# Patient Record
Sex: MALE | Race: BLACK OR AFRICAN AMERICAN | NOT HISPANIC OR LATINO | Employment: UNEMPLOYED | URBAN - METROPOLITAN AREA
[De-identification: names, ages, dates, MRNs, and addresses within clinical notes are randomized per-mention and may not be internally consistent; named-entity substitution may affect disease eponyms.]

---

## 2021-02-24 ENCOUNTER — ANCILLARY PROCEDURE (OUTPATIENT)
Dept: CT IMAGING | Facility: CLINIC | Age: 41
End: 2021-02-24
Attending: FAMILY MEDICINE
Payer: COMMERCIAL

## 2021-02-24 DIAGNOSIS — G43.709 CHRONIC MIGRAINE WITHOUT AURA WITHOUT STATUS MIGRAINOSUS, NOT INTRACTABLE: ICD-10-CM

## 2021-02-24 DIAGNOSIS — G44.89 OTHER HEADACHE SYNDROME: ICD-10-CM

## 2021-02-24 PROCEDURE — 70450 CT HEAD/BRAIN W/O DYE: CPT | Mod: GC | Performed by: RADIOLOGY

## 2021-02-24 PROCEDURE — 70491 CT SOFT TISSUE NECK W/DYE: CPT | Mod: GC | Performed by: RADIOLOGY

## 2021-02-24 RX ORDER — IOPAMIDOL 755 MG/ML
100 INJECTION, SOLUTION INTRAVASCULAR ONCE
Status: COMPLETED | OUTPATIENT
Start: 2021-02-24 | End: 2021-02-24

## 2021-02-24 RX ADMIN — IOPAMIDOL 100 ML: 755 INJECTION, SOLUTION INTRAVASCULAR at 16:49

## 2021-03-26 ENCOUNTER — THERAPY VISIT (OUTPATIENT)
Dept: PHYSICAL THERAPY | Facility: CLINIC | Age: 41
End: 2021-03-26
Payer: COMMERCIAL

## 2021-03-26 DIAGNOSIS — M25.551 HIP PAIN, RIGHT: ICD-10-CM

## 2021-03-26 DIAGNOSIS — M25.552 HIP PAIN, LEFT: ICD-10-CM

## 2021-03-26 PROCEDURE — 97161 PT EVAL LOW COMPLEX 20 MIN: CPT | Mod: GP | Performed by: PHYSICAL THERAPIST

## 2021-03-26 PROCEDURE — 97110 THERAPEUTIC EXERCISES: CPT | Mod: GP | Performed by: PHYSICAL THERAPIST

## 2021-03-26 ASSESSMENT — ACTIVITIES OF DAILY LIVING (ADL)
GOING_DOWN_1_FLIGHT_OF_STAIRS: NO DIFFICULTY AT ALL
WALKING_DOWN_STEEP_HILLS: NO DIFFICULTY AT ALL
ROLLING_OVER_IN_BED: NO DIFFICULTY AT ALL
HOS_ADL_SCORE(%): 100
HOS_ADL_HIGHEST_POTENTIAL_SCORE: 68
GETTING_INTO_AND_OUT_OF_A_BATHTUB: NO DIFFICULTY AT ALL
STEPPING_UP_AND_DOWN_CURBS: NO DIFFICULTY AT ALL
WALKING_UP_STEEP_HILLS: NO DIFFICULTY AT ALL
HOS_ADL_COUNT: 17
HEAVY_WORK: NO DIFFICULTY AT ALL
SITTING_FOR_15_MINUTES: NO DIFFICULTY AT ALL
HOS_ADL_ITEM_SCORE_TOTAL: 68
LIGHT_TO_MODERATE_WORK: NO DIFFICULTY AT ALL
DEEP_SQUATTING: NO DIFFICULTY AT ALL
STANDING_FOR_15_MINUTES: NO DIFFICULTY AT ALL
RECREATIONAL_ACTIVITIES: NO DIFFICULTY AT ALL
WALKING_APPROXIMATELY_10_MINUTES: NO DIFFICULTY AT ALL
PUTTING_ON_SOCKS_AND_SHOES: NO DIFFICULTY AT ALL
TWISTING/PIVOTING_ON_INVOLVED_LEG: NO DIFFICULTY AT ALL
WALKING_15_MINUTES_OR_GREATER: NO DIFFICULTY AT ALL
GOING_UP_1_FLIGHT_OF_STAIRS: NO DIFFICULTY AT ALL
WALKING_INITIALLY: NO DIFFICULTY AT ALL
GETTING_INTO_AND_OUT_OF_AN_AVERAGE_CAR: NO DIFFICULTY AT ALL

## 2021-03-26 NOTE — LETTER
[facility]  [address] [address]  [phone]    2021    Re: Jose A Rojo   :   1980  MRN:  [id number]   REFERRING PHYSICIAN:   Dar CASTRO [facility]    Date of Initial Evaluation:  3/26/21  Visits:  Rxs Used: 1  Reason for Referral:     Hip pain, left  Hip pain, right    EVALUATION SUMMARY    [facility] Physical Therapy Initial Evaluation  3/26/2021     Precautions/Restrictions/MD instructions: Evaluate and Treat for R hip pain    Therapist Assessment: Jose A Rojo is a 40 year old male patient presenting to Physical Therapy with R hip pain. Patient demonstrates decreased R hip abduction strength and pain with passive R hip flexion and IR. Signs and symptoms are consistent with potential JUDY and labral pathology. These impairments limit their ability to be seated for longer periods of time, as well as to lay on his R side while trying to sleep. Skilled PT services are necessary in order to reduce impairments and improve independent function.    Subjective:   ** Of note, Archana interpretor present via iPad to assist with interpretation.     Injury/Condition Details:  Presenting Complaint R hip pain   Onset Timing/Date Chronic R hip pain; MD referral from 3/9/2021   Mechanism No specific mechanism of injury. Pt reports he injured his R leg at one point when he was a young boy and feels like his leg hasn't felt the same since.      Symptom Behavior Details    Primary Symptoms Sporadic symptoms; Activity/position dependent, pain (Location: front of R hip, Quality: Aching/Throbbing), weakness, popping/clicking   Worst Pain 8/10   Symptom Provocators Laying on R side, prolonged sitting    Best Pain 1-2/10    Symptom Relievers Exercising   Time of day dependent? Worse in evening after activity and early in the morning   Recent symptom change? no change in  "symptoms     Prior Testing/Intervention for current condition:  Prior Tests  none   Prior Treatment none     Lifestyle & General Medical History:  Employment N/A   Usual physical activities  (within past year) Prolonged sitting   Orthopaedic History  Previous injury when younger to his R leg   Medication  Medication to help with migraines   Notable medical history See Epic Chart; migraines   Patient goals Improve discomfort   Patient Reported Health good     Red Flags: (Bold when present) - reviewed the following and denies  Malaise, unexplained weight loss, night pain, fever    Objective:  Posture: mild leg length discrepancy noted, with R leg appearing to be slightly longer than L leg  Lumbar Spine Screen: no notable deficits  Gait: no notable deficits  Functional:   - DL squat: good depth, no increased pain  Palpation: TTP of anterior R hip, as well as well as in \"C sign\" distribution of R hip    HIP: (* indicates patient's primary complaint)   PROM R PROM L AROM R AROM L MMT R MMT L   Flexion WNL, pain WNL       IR WNL, pain WNL       ER WNL WNL       Abduction WNL WNL   3+ 4   Extension WNL WNL   4 4+   Adduction WNL, pain WNL       KNEE         Ext         Flx           Special tests: blank if not tested; * indicates patient's primary complaint   R L   Sabina's - -   Tereso GALEANO   + -   FADIR + -   Scour     SLR     Hamstring 90-90     Log Roll     SIJ Compression       Re: Jose A Rojo   :   1980    ASSESSMENT/PLAN    Patient is a 40 year old male with right sided hip complaints.    Patient has the following significant findings with corresponding treatment plan.                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    Comorbidity factors that impact the plan of care are:      None.     Medications impacting care: None.  2) Examination of Body Systems comprised of:   Body structures and functions that impact the plan of care:      Hip.   Activity limitations that impact the plan of care are:      Sitting, Working, Sleeping and Laying down.  3) Clinical presentation characteristics are:   Stable/Uncomplicated.  4) Decision-Making    Moderate complexity using standardized patient assessment instrument and/or measureable assessment of functional outcome.    Cumulative Therapy Evaluation is: Low complexity.    Previous and current functional limitations:  (See Goal Flow Sheet for this information)    Short term and Long term goals: (See Goal Flow Sheet for this information)     Communication ability:  Patient has an  for communication clarity.  Treatment Explanation - The following has been discussed with the patient:   RX ordered/plan of care  Anticipated outcomes  Possible risks and side effects  This patient would benefit from PT intervention to resume normal activities.   Rehab potential is good.    Frequency:  1 X week, once daily  Duration:  for 6 weeks  Discharge Plan:  Achieve all LTG.  Independent in home treatment program.  Reach maximal therapeutic benefit.        Thank you for your referral.    INQUIRIES  Therapist: Ezio Richard PT   [address] [address]  Phone: [phone]  Fax: [phone]

## 2021-03-26 NOTE — PROGRESS NOTES
[facility] Physical Therapy Initial Evaluation  3/26/2021     Precautions/Restrictions/MD instructions: Evaluate and Treat for R hip pain    Therapist Assessment: Jose A Rojo is a 40 year old male patient presenting to Physical Therapy with R hip pain. Patient demonstrates decreased R hip abduction strength and pain with passive R hip flexion and IR. Signs and symptoms are consistent with potential JUDY and labral pathology. These impairments limit their ability to be seated for longer periods of time, as well as to lay on his R side while trying to sleep. Skilled PT services are necessary in order to reduce impairments and improve independent function.    Subjective:     ** Of note, Archana interpretor present via iPad to assist with interpretation.     Injury/Condition Details:  Presenting Complaint R hip pain   Onset Timing/Date Chronic R hip pain; MD referral from 3/9/2021   Mechanism No specific mechanism of injury. Pt reports he injured his R leg at one point when he was a young boy and feels like his leg hasn't felt the same since.      Symptom Behavior Details    Primary Symptoms Sporadic symptoms; Activity/position dependent, pain (Location: front of R hip, Quality: Aching/Throbbing), weakness, popping/clicking   Worst Pain 8/10   Symptom Provocators Laying on R side, prolonged sitting    Best Pain 1-2/10    Symptom Relievers Exercising   Time of day dependent? Worse in evening after activity and early in the morning   Recent symptom change? no change in symptoms     Prior Testing/Intervention for current condition:  Prior Tests  none   Prior Treatment none     Lifestyle & General Medical History:  Employment N/A   Usual physical activities  (within past year) Prolonged sitting   Orthopaedic History  Previous injury when younger to his R leg   Medication  Medication to help with migraines   Notable medical history See Epic Chart; migraines   Patient goals Improve discomfort   Patient Reported Health  "good     Red Flags: (Bold when present) - reviewed the following and denies  Malaise, unexplained weight loss, night pain, fever    Objective:    Posture: mild leg length discrepancy noted, with R leg appearing to be slightly longer than L leg    Lumbar Spine Screen: no notable deficits    Gait: no notable deficits    Functional:   - DL squat: good depth, no increased pain    Palpation: TTP of anterior R hip, as well as well as in \"C sign\" distribution of R hip    HIP: (* indicates patient's primary complaint)   PROM R PROM L AROM R AROM L MMT R MMT L   Flexion WNL, pain WNL       IR WNL, pain WNL       ER WNL WNL       Abduction WNL WNL   3+ 4   Extension WNL WNL   4 4+   Adduction WNL, pain WNL       KNEE         Ext         Flx             Special tests: blank if not tested; * indicates patient's primary complaint   R L   Sabina's - -   Tereso WILSONER + -   FADIR + -   Scour     SLR     Hamstring 90-90     Log Roll     SIJ Compression         ASSESSMENT/PLAN  Patient is a 40 year old male with right sided hip complaints.    Patient has the following significant findings with corresponding treatment plan.                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    Comorbidity factors that impact the plan of care are:      None.     Medications impacting care: None.  2) Examination of Body Systems comprised of:   Body structures and functions that impact the plan of care:      Hip.   Activity " limitations that impact the plan of care are:      Sitting, Working, Sleeping and Laying down.  3) Clinical presentation characteristics are:   Stable/Uncomplicated.  4) Decision-Making    Moderate complexity using standardized patient assessment instrument and/or measureable assessment of functional outcome.  Cumulative Therapy Evaluation is: Low complexity.    Previous and current functional limitations:  (See Goal Flow Sheet for this information)    Short term and Long term goals: (See Goal Flow Sheet for this information)     Communication ability:  Patient has an  for communication clarity.  Treatment Explanation - The following has been discussed with the patient:   RX ordered/plan of care  Anticipated outcomes  Possible risks and side effects  This patient would benefit from PT intervention to resume normal activities.   Rehab potential is good.    Frequency:  1 X week, once daily  Duration:  for 6 weeks  Discharge Plan:  Achieve all LTG.  Independent in home treatment program.  Reach maximal therapeutic benefit.    Please refer to the daily flowsheet for treatment today, total treatment time and time spent performing 1:1 timed codes.

## 2021-08-07 ENCOUNTER — HEALTH MAINTENANCE LETTER (OUTPATIENT)
Age: 41
End: 2021-08-07

## 2021-10-02 ENCOUNTER — HEALTH MAINTENANCE LETTER (OUTPATIENT)
Age: 41
End: 2021-10-02

## 2021-11-19 PROBLEM — M25.552 HIP PAIN, LEFT: Status: RESOLVED | Noted: 2021-03-26 | Resolved: 2021-11-19

## 2021-11-19 PROBLEM — M25.551 HIP PAIN, RIGHT: Status: RESOLVED | Noted: 2021-03-26 | Resolved: 2021-11-19

## 2021-11-24 ENCOUNTER — LAB REQUISITION (OUTPATIENT)
Dept: LAB | Facility: CLINIC | Age: 41
End: 2021-11-24
Payer: COMMERCIAL

## 2021-11-24 DIAGNOSIS — Z11.1 ENCOUNTER FOR SCREENING FOR RESPIRATORY TUBERCULOSIS: ICD-10-CM

## 2021-11-24 PROCEDURE — 86481 TB AG RESPONSE T-CELL SUSP: CPT | Mod: ORL | Performed by: FAMILY MEDICINE

## 2021-11-26 LAB
GAMMA INTERFERON BACKGROUND BLD IA-ACNC: 5.26 IU/ML
M TB IFN-G BLD-IMP: POSITIVE
M TB IFN-G CD4+ BCKGRND COR BLD-ACNC: 4.74 IU/ML
MITOGEN IGNF BCKGRD COR BLD-ACNC: 4.74 IU/ML
MITOGEN IGNF BCKGRD COR BLD-ACNC: 4.74 IU/ML
QUANTIFERON MITOGEN: 10 IU/ML
QUANTIFERON NIL TUBE: 5.26 IU/ML
QUANTIFERON TB1 TUBE: 10 IU/ML
QUANTIFERON TB2 TUBE: 10

## 2021-12-03 ENCOUNTER — HOSPITAL ENCOUNTER (OUTPATIENT)
Dept: GENERAL RADIOLOGY | Facility: CLINIC | Age: 41
Discharge: HOME | End: 2021-12-03
Attending: FAMILY MEDICINE | Admitting: FAMILY MEDICINE
Payer: COMMERCIAL

## 2021-12-03 DIAGNOSIS — Z11.1 ENCOUNTER FOR SCREENING FOR RESPIRATORY TUBERCULOSIS: ICD-10-CM

## 2021-12-03 PROCEDURE — 71045 X-RAY EXAM CHEST 1 VIEW: CPT | Mod: 26 | Performed by: RADIOLOGY

## 2021-12-03 PROCEDURE — 71045 X-RAY EXAM CHEST 1 VIEW: CPT

## 2022-08-12 ENCOUNTER — MEDICAL CORRESPONDENCE (OUTPATIENT)
Dept: HEALTH INFORMATION MANAGEMENT | Facility: CLINIC | Age: 42
End: 2022-08-12

## 2022-08-18 ENCOUNTER — TRANSCRIBE ORDERS (OUTPATIENT)
Dept: OTHER | Age: 42
End: 2022-08-18

## 2022-08-18 DIAGNOSIS — M25.512 BILATERAL SHOULDER PAIN, UNSPECIFIED CHRONICITY: Primary | ICD-10-CM

## 2022-08-18 DIAGNOSIS — M25.511 BILATERAL SHOULDER PAIN, UNSPECIFIED CHRONICITY: Primary | ICD-10-CM

## 2022-08-28 ENCOUNTER — HEALTH MAINTENANCE LETTER (OUTPATIENT)
Age: 42
End: 2022-08-28

## 2022-08-29 ENCOUNTER — MEDICAL CORRESPONDENCE (OUTPATIENT)
Dept: HEALTH INFORMATION MANAGEMENT | Facility: CLINIC | Age: 42
End: 2022-08-29

## 2022-09-06 NOTE — TELEPHONE ENCOUNTER
DIAGNOSIS: Bilateral shoulder pain / imaging ??/   APPOINTMENT DATE: 9.12.22   NOTES STATUS DETAILS   OFFICE NOTE from referring provider Care Everywhere 8.22.22 Dr Dar Root, [facility]  11.24.21 8.12.21   OFFICE NOTE from other specialist Care Everywhere 8.12.22 Ana Britt [facility]   MEDICATION LIST Care Everywhere

## 2022-09-08 DIAGNOSIS — M25.511 BILATERAL SHOULDER PAIN: Primary | ICD-10-CM

## 2022-09-08 DIAGNOSIS — M25.512 BILATERAL SHOULDER PAIN: Primary | ICD-10-CM

## 2022-09-12 ENCOUNTER — PRE VISIT (OUTPATIENT)
Dept: ORTHOPEDICS | Facility: CLINIC | Age: 42
End: 2022-09-12

## 2022-09-12 ENCOUNTER — OFFICE VISIT (OUTPATIENT)
Dept: ORTHOPEDICS | Facility: CLINIC | Age: 42
End: 2022-09-12
Payer: COMMERCIAL

## 2022-09-12 ENCOUNTER — ANCILLARY PROCEDURE (OUTPATIENT)
Dept: GENERAL RADIOLOGY | Facility: CLINIC | Age: 42
End: 2022-09-12
Attending: FAMILY MEDICINE
Payer: COMMERCIAL

## 2022-09-12 DIAGNOSIS — M25.511 BILATERAL SHOULDER PAIN, UNSPECIFIED CHRONICITY: ICD-10-CM

## 2022-09-12 DIAGNOSIS — M25.512 BILATERAL SHOULDER PAIN, UNSPECIFIED CHRONICITY: ICD-10-CM

## 2022-09-12 PROCEDURE — 73030 X-RAY EXAM OF SHOULDER: CPT | Mod: RT | Performed by: RADIOLOGY

## 2022-09-12 PROCEDURE — 99203 OFFICE O/P NEW LOW 30 MIN: CPT | Performed by: FAMILY MEDICINE

## 2022-09-12 NOTE — PROGRESS NOTES
[facility]  SPORTS & ORTHOPEDIC CLINIC VISIT     Sep 12, 2022        ASSESSMENT & PLAN    41-year-old with chronic bilateral shoulder pain likely due to recurrent biceps and rotator cuff tendinopathy.  No major structural injury suspected.    Reviewed imaging and assessment with patient in detail  Follow-up with with physical therapy for home exercise program and treatment  Ok to use diclofenac up to three times daily if needed for pain  Follow up in clinic after 6 weeks  May consider ultrasound-guided steroid injection for biceps or subacromial if pain unimproved or if therapy proves too painful.    Mason Waller MD  [facility] SPORTS MEDICINE CLINIC [place]    -----  Chief Complaint   Patient presents with     Right Shoulder - Pain     Left Shoulder - Pain       SUBJECTIVE  Jose A Rojo is a/an 41 year old male who is seen in consultation at the request of  Dar Root M.D. for evaluation of  Bilateral shoulder pain.     The patient is seen with an  via [employer].  The patient is Right handed    Onset: 2019. Reports insidious onset without acute precipitating event.  Location of Pain: bilateral anterior shoulder pain  Worsened by: Above head motion, reaching behind, reaching out   Better with: NA   Treatments tried: Tylenol, ibuprofen and corticosteroid injection (most recent date: 11/24/21) that provided  0 day(s) of relief  Associated symptoms: no distal numbness or tingling; denies swelling or warmth    Orthopedic/Surgical history: NO  Social History/Occupation: Warehouse       REVIEW OF SYSTEMS:    Do you have fever, chills, weight loss? No    Do you have any vision problems? No    Do you have any chest pain or edema? No    Do you have any shortness of breath or wheezing?  No    Do you have stomach problems? No    Do you have any numbness or focal weakness? No    Do you have diabetes? No    Do you have problems with bleeding or clotting? No    Do you  have an rashes or other skin lesions? No    OBJECTIVE:  There were no vitals taken for this visit.     EXAM:  Alert, pleasant and conversational    Neck with full AROM, non-tender to midline cervical and upper thoracic spine palpation, negative Spurling's.  Elbow with FROM and non-tender to palpation      Bilateral shoulder:   Skin intact. No skin changes, deformity or atrophy    AROM:   Forward Flexion: 180    Abduction: 180    External rotation: ~70    Internal rotation: T7.   Symmetric    Strength testing:   Abduction: 5/5,   External rotation: 5/5   Internal rotation 5/5     Deltoids 5/5, Biceps 5/5, Triceps 5/5,  5/5.    Palpation: negative TTP of the Acromioclavicular joint  negative TTP of Sternoclavicular joint  negative TTP of posterior glenoid  negative TTP of scapular borders  negative TTP of the bicipital tendon.     Special Tests: positive  Sinclair test  positive  Neer's test.   negativeO'Chaim's test   positive  Speed's test.    Neurovascularly intact bilateral upper extremities.      RADIOLOGY:    4 view xrays of bilateral shoulders performed and reviewed independently demonstrating no acute fracture or dislocation.  No significant DJD. See EMR for formal radiology report.

## 2022-09-12 NOTE — LETTER
9/12/2022      RE: Jose A Rojo  [address]   [place] [address]     Dear Colleague,    Thank you for referring your patient, Jose A Rojo, to the [facility] SPORTS MEDICINE [facility]. Please see a copy of my visit note below.      [facility] SUR[facility]ERY [facility]  SPORTS & ORTHOPEDIC CLINIC VISIT     Sep 12, 2022        ASSESSMENT & PLAN    41-year-old with chronic bilateral shoulder pain likely due to recurrent biceps and rotator cuff tendinopathy.  No major structural injury suspected.    Reviewed imaging and assessment with patient in detail  Follow-up with with physical therapy for home exercise program and treatment  Ok to use diclofenac up to three times daily if needed for pain  Follow up in clinic after 6 weeks  May consider ultrasound-guided steroid injection for biceps or subacromial if pain unimproved or if therapy proves too painful.    Mason Waller MD  [facility] SPORTS MEDICINE [facility]    -----  Chief Complaint   Patient presents with     Right Shoulder - Pain     Left Shoulder - Pain       SUBJECTIVE  Jose A Rojo is a/an 41 year old male who is seen in consultation at the request of  Dar Root M.D. for evaluation of  Bilateral shoulder pain.     The patient is seen with an  via [employer].  The patient is Right handed    Onset: 2019. Reports insidious onset without acute precipitating event.  Location of Pain: bilateral anterior shoulder pain  Worsened by: Above head motion, reaching behind, reaching out   Better with: NA   Treatments tried: Tylenol, ibuprofen and corticosteroid injection (most recent date: 11/24/21) that provided  0 day(s) of relief  Associated symptoms: no distal numbness or tingling; denies swelling or warmth    Orthopedic/Surgical history: NO  Social History/Occupation: [employer]       REVIEW OF SYSTEMS:    Do you have fever, chills, weight loss? No    Do you have any vision problems? No    Do  you have any chest pain or edema? No    Do you have any shortness of breath or wheezing?  No    Do you have stomach problems? No    Do you have any numbness or focal weakness? No    Do you have diabetes? No    Do you have problems with bleeding or clotting? No    Do you have an rashes or other skin lesions? No    OBJECTIVE:  There were no vitals taken for this visit.     EXAM:  Alert, pleasant and conversational    Neck with full AROM, non-tender to midline cervical and upper thoracic spine palpation, negative Spurling's.  Elbow with FROM and non-tender to palpation      Bilateral shoulder:   Skin intact. No skin changes, deformity or atrophy    AROM:   Forward Flexion: 180    Abduction: 180    External rotation: ~70    Internal rotation: T7.   Symmetric    Strength testing:   Abduction: 5/5,   External rotation: 5/5   Internal rotation 5/5     Deltoids 5/5, Biceps 5/5, Triceps 5/5,  5/5.    Palpation: negative TTP of the Acromioclavicular joint  negative TTP of Sternoclavicular joint  negative TTP of posterior glenoid  negative TTP of scapular borders  negative TTP of the bicipital tendon.     Special Tests: positive  Sinclair test  positive  Neer's test.   negativeO'Hcaim's test   positive  Speed's test.    Neurovascularly intact bilateral upper extremities.      RADIOLOGY:    4 view xrays of bilateral shoulders performed and reviewed independently demonstrating no acute fracture or dislocation.  No significant DJD. See EMR for formal radiology report.       Again, thank you for allowing me to participate in the care of your patient.      Sincerely,    Mason Waller MD

## 2023-01-14 ENCOUNTER — HEALTH MAINTENANCE LETTER (OUTPATIENT)
Age: 43
End: 2023-01-14

## 2023-09-30 ENCOUNTER — HEALTH MAINTENANCE LETTER (OUTPATIENT)
Age: 43
End: 2023-09-30

## 2023-12-05 ENCOUNTER — LAB REQUISITION (OUTPATIENT)
Dept: LAB | Facility: CLINIC | Age: 43
End: 2023-12-05
Payer: COMMERCIAL

## 2023-12-05 DIAGNOSIS — Z00.00 ENCOUNTER FOR GENERAL ADULT MEDICAL EXAMINATION WITHOUT ABNORMAL FINDINGS: ICD-10-CM

## 2023-12-05 LAB
ALBUMIN SERPL BCG-MCNC: 4.5 G/DL (ref 3.5–5.2)
ALP SERPL-CCNC: 76 U/L (ref 40–150)
ALT SERPL W P-5'-P-CCNC: 17 U/L (ref 0–70)
ANION GAP SERPL CALCULATED.3IONS-SCNC: 11 MMOL/L (ref 7–15)
AST SERPL W P-5'-P-CCNC: 24 U/L (ref 0–45)
BILIRUB SERPL-MCNC: 0.4 MG/DL
BUN SERPL-MCNC: 12.1 MG/DL (ref 6–20)
CALCIUM SERPL-MCNC: 9.4 MG/DL (ref 8.6–10)
CHLORIDE SERPL-SCNC: 99 MMOL/L (ref 98–107)
CHOLEST SERPL-MCNC: 211 MG/DL
CREAT SERPL-MCNC: 0.99 MG/DL (ref 0.67–1.17)
DEPRECATED HCO3 PLAS-SCNC: 26 MMOL/L (ref 22–29)
EGFRCR SERPLBLD CKD-EPI 2021: >90 ML/MIN/1.73M2
FASTING STATUS PATIENT QL REPORTED: NO
GLUCOSE SERPL-MCNC: 85 MG/DL (ref 70–99)
HDLC SERPL-MCNC: 51 MG/DL
LDLC SERPL CALC-MCNC: 139 MG/DL
NONHDLC SERPL-MCNC: 160 MG/DL
POTASSIUM SERPL-SCNC: 3.5 MMOL/L (ref 3.4–5.3)
PROT SERPL-MCNC: 7.3 G/DL (ref 6.4–8.3)
SODIUM SERPL-SCNC: 136 MMOL/L (ref 135–145)
TRIGL SERPL-MCNC: 104 MG/DL

## 2023-12-05 PROCEDURE — 80061 LIPID PANEL: CPT | Mod: ORL | Performed by: FAMILY MEDICINE

## 2023-12-05 PROCEDURE — 80053 COMPREHEN METABOLIC PANEL: CPT | Mod: ORL | Performed by: FAMILY MEDICINE

## 2024-05-09 ENCOUNTER — MEDICAL CORRESPONDENCE (OUTPATIENT)
Dept: HEALTH INFORMATION MANAGEMENT | Facility: CLINIC | Age: 44
End: 2024-05-09
Payer: COMMERCIAL

## 2024-05-13 ENCOUNTER — TRANSCRIBE ORDERS (OUTPATIENT)
Dept: OTHER | Age: 44
End: 2024-05-13

## 2024-05-13 DIAGNOSIS — L90.5 SCAR: Primary | ICD-10-CM

## 2025-01-05 ENCOUNTER — HEALTH MAINTENANCE LETTER (OUTPATIENT)
Age: 45
End: 2025-01-05

## 2025-02-11 ENCOUNTER — LAB REQUISITION (OUTPATIENT)
Dept: LAB | Facility: CLINIC | Age: 45
End: 2025-02-11
Payer: COMMERCIAL

## 2025-02-11 DIAGNOSIS — Z11.3 ENCOUNTER FOR SCREENING FOR INFECTIONS WITH A PREDOMINANTLY SEXUAL MODE OF TRANSMISSION: ICD-10-CM

## 2025-02-11 DIAGNOSIS — Z00.00 ENCOUNTER FOR GENERAL ADULT MEDICAL EXAMINATION WITHOUT ABNORMAL FINDINGS: ICD-10-CM

## 2025-02-11 DIAGNOSIS — Z13.220 ENCOUNTER FOR SCREENING FOR LIPOID DISORDERS: ICD-10-CM

## 2025-02-12 LAB
ALBUMIN SERPL BCG-MCNC: 4.3 G/DL (ref 3.5–5.2)
ALP SERPL-CCNC: 104 U/L (ref 40–150)
ALT SERPL W P-5'-P-CCNC: 22 U/L (ref 0–70)
ANION GAP SERPL CALCULATED.3IONS-SCNC: 12 MMOL/L (ref 7–15)
AST SERPL W P-5'-P-CCNC: 26 U/L (ref 0–45)
BILIRUB SERPL-MCNC: 0.4 MG/DL
BUN SERPL-MCNC: 11.1 MG/DL (ref 6–20)
C TRACH DNA SPEC QL NAA+PROBE: NEGATIVE
CALCIUM SERPL-MCNC: 10.1 MG/DL (ref 8.8–10.4)
CHLORIDE SERPL-SCNC: 102 MMOL/L (ref 98–107)
CHOLEST SERPL-MCNC: 255 MG/DL
CREAT SERPL-MCNC: 0.85 MG/DL (ref 0.67–1.17)
EGFRCR SERPLBLD CKD-EPI 2021: >90 ML/MIN/1.73M2
FASTING STATUS PATIENT QL REPORTED: ABNORMAL
FASTING STATUS PATIENT QL REPORTED: NORMAL
GLUCOSE SERPL-MCNC: 88 MG/DL (ref 70–99)
HCO3 SERPL-SCNC: 27 MMOL/L (ref 22–29)
HDLC SERPL-MCNC: 60 MG/DL
HIV 1+2 AB+HIV1 P24 AG SERPL QL IA: NONREACTIVE
LDLC SERPL CALC-MCNC: 177 MG/DL
N GONORRHOEA DNA SPEC QL NAA+PROBE: NEGATIVE
NONHDLC SERPL-MCNC: 195 MG/DL
POTASSIUM SERPL-SCNC: 4 MMOL/L (ref 3.4–5.3)
PROT SERPL-MCNC: 7.3 G/DL (ref 6.4–8.3)
SODIUM SERPL-SCNC: 141 MMOL/L (ref 135–145)
SPECIMEN TYPE: NORMAL
SPECIMEN TYPE: NORMAL
TRIGL SERPL-MCNC: 92 MG/DL

## 2025-02-19 ENCOUNTER — LAB REQUISITION (OUTPATIENT)
Dept: LAB | Facility: CLINIC | Age: 45
End: 2025-02-19
Payer: COMMERCIAL

## 2025-02-19 DIAGNOSIS — Z00.00 ENCOUNTER FOR GENERAL ADULT MEDICAL EXAMINATION WITHOUT ABNORMAL FINDINGS: ICD-10-CM

## 2025-02-19 DIAGNOSIS — Z12.12 ENCOUNTER FOR SCREENING FOR MALIGNANT NEOPLASM OF RECTUM: ICD-10-CM

## 2025-02-19 DIAGNOSIS — Z12.11 ENCOUNTER FOR SCREENING FOR MALIGNANT NEOPLASM OF COLON: ICD-10-CM

## 2025-02-19 PROCEDURE — 82274 ASSAY TEST FOR BLOOD FECAL: CPT | Mod: ORL | Performed by: NURSE PRACTITIONER

## 2025-02-20 LAB — HEMOCCULT STL QL IA: NEGATIVE
